# Patient Record
Sex: MALE | Race: WHITE | NOT HISPANIC OR LATINO | Employment: FULL TIME | URBAN - METROPOLITAN AREA
[De-identification: names, ages, dates, MRNs, and addresses within clinical notes are randomized per-mention and may not be internally consistent; named-entity substitution may affect disease eponyms.]

---
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
## 2024-01-09 ENCOUNTER — TELEPHONE (OUTPATIENT)
Dept: PEDIATRIC GASTROENTEROLOGY | Facility: HOSPITAL | Age: 19
End: 2024-01-09
Payer: COMMERCIAL

## 2024-01-09 DIAGNOSIS — K50.90 CROHN'S DISEASE WITHOUT COMPLICATION, UNSPECIFIED GASTROINTESTINAL TRACT LOCATION (MULTI): ICD-10-CM

## 2024-01-09 DIAGNOSIS — Z79.620 ADALIMUMAB (HUMIRA) LONG-TERM USE: ICD-10-CM

## 2024-01-09 RX ORDER — ADALIMUMAB 40MG/0.4ML
40 KIT SUBCUTANEOUS
Qty: 4 EACH | Refills: 11 | Status: SHIPPED | OUTPATIENT
Start: 2024-01-09

## 2024-01-09 NOTE — TELEPHONE ENCOUNTER
Did  you get a request for clinicals from [facility] for the Humira?  They told dad to check with us about that.

## 2024-01-12 ENCOUNTER — TELEPHONE (OUTPATIENT)
Dept: PEDIATRIC GASTROENTEROLOGY | Facility: HOSPITAL | Age: 19
End: 2024-01-12
Payer: COMMERCIAL

## 2024-01-17 ENCOUNTER — TELEPHONE (OUTPATIENT)
Dept: PEDIATRIC GASTROENTEROLOGY | Facility: CLINIC | Age: 19
End: 2024-01-17
Payer: COMMERCIAL

## 2024-05-14 ENCOUNTER — LAB (OUTPATIENT)
Dept: LAB | Facility: LAB | Age: 19
End: 2024-05-14
Payer: COMMERCIAL

## 2024-05-14 DIAGNOSIS — K50.00 CROHN'S DISEASE OF SMALL INTESTINE WITHOUT COMPLICATION (MULTI): Chronic | ICD-10-CM

## 2024-05-14 PROCEDURE — 83993 ASSAY FOR CALPROTECTIN FECAL: CPT

## 2024-05-15 ENCOUNTER — OFFICE VISIT (OUTPATIENT)
Dept: PEDIATRIC GASTROENTEROLOGY | Facility: CLINIC | Age: 19
End: 2024-05-15
Payer: COMMERCIAL

## 2024-05-15 VITALS — TEMPERATURE: 97.5 F | BODY MASS INDEX: 25.86 KG/M2 | WEIGHT: 190.92 LBS | HEIGHT: 72 IN

## 2024-05-15 DIAGNOSIS — K50.00 CROHN'S DISEASE OF SMALL INTESTINE WITHOUT COMPLICATION (MULTI): Primary | ICD-10-CM

## 2024-05-15 DIAGNOSIS — Z79.620 ADALIMUMAB (HUMIRA) LONG-TERM USE: ICD-10-CM

## 2024-05-15 PROCEDURE — 99214 OFFICE O/P EST MOD 30 MIN: CPT | Performed by: NURSE PRACTITIONER

## 2024-05-15 PROCEDURE — 1036F TOBACCO NON-USER: CPT | Performed by: NURSE PRACTITIONER

## 2024-05-15 ASSESSMENT — ENCOUNTER SYMPTOMS
EYE REDNESS: 0
HEMATOLOGIC/LYMPHATIC NEGATIVE: 1
FATIGUE: 0
ACTIVITY CHANGE: 0
ENDOCRINE NEGATIVE: 1
JOINT SWELLING: 0
CHOKING: 0
TROUBLE SWALLOWING: 0
EYE PAIN: 0
COUGH: 0
PSYCHIATRIC NEGATIVE: 1
SORE THROAT: 0
ROS GI COMMENTS: AS NOTED IN HPI
HEADACHES: 0
DYSURIA: 0
CARDIOVASCULAR NEGATIVE: 1
SEIZURES: 0
APPETITE CHANGE: 0
ARTHRALGIAS: 0

## 2024-05-15 NOTE — LETTER
May 19, 2024     Jabier Harrison MD  [id number] [facility]  [address]  [facility] [address]    Patient: Neo Chavez   YOB: 2005   Date of Visit: 5/15/2024       Dear Dr. Jabier Harrison MD:    Thank you for referring Neo Chavez to me for evaluation. Below are my notes for this consultation.  If you have questions, please do not hesitate to call me. I look forward to following your patient along with you.       Sincerely,     Antonietta Wan, APRN-CNP      CC: No Recipients  ______________________________________________________________________________________    Pediatric Gastroenterology Follow Up Office Visit    Neo Chavez was seen in the [facility] Pediatric Gastroenterology, Hepatology & Nutrition Clinic in follow-up on 5/15/2024  for ileal Crohn's disease  Chief Complaint   Patient presents with   • Crohn's Disease       History of Present Illness:   Neo Chavez is a 19 y.o. male who presents to GI clinic for the management of Ileal Crohn's disease. He is doing well today. No symptoms between injections. Continues to get small patch of eczema above left ear. Heat helps improve patch. Uses eczema cream as needed which helps keep moist. Humira injections on Wednesdays.  Weight is down today but has changed eating habits and is working as a  this summer.     Medications:  - Humira 40mg weekly    Review of Systems   Constitutional:  Negative for activity change, appetite change and fatigue.   HENT:  Negative for mouth sores, sore throat and trouble swallowing.    Eyes:  Negative for pain and redness.   Respiratory:  Negative for cough and choking.    Cardiovascular: Negative.    Gastrointestinal:         As noted in HPI   Endocrine: Negative.    Genitourinary:  Negative for dysuria and enuresis.   Musculoskeletal:  Negative for arthralgias and joint swelling.   Skin: Negative.    Neurological:  Negative for seizures and headaches.   Hematological:  Negative.    Psychiatric/Behavioral: Negative.          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   • Vitamin D deficiency 06/15/2023   • Weight loss, abnormal 06/15/2023     Past Medical History:   Diagnosis Date   • Personal history of other specified conditions 10/28/2020   • Unspecified injury of unspecified ankle, initial encounter 09/09/2015   • Unspecified open wound of abdominal wall, unspecified quadrant without penetration into peritoneal cavity, initial encounter 08/10/2020   • Unspecified otitis externa, unspecified ear 07/16/2016       Past Medical History:   Diagnosis Date   • Abdominal pain 06/15/2023   • Encopresis 06/15/2023   • Excoriated rash 06/15/2023   • Gastritis 06/15/2023   • Hematochezia 06/15/2023   • Hidradenitis suppurativa 06/15/2023   • 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   • Personal history of other specified conditions 10/28/2020    History of abdominal pain   • Unspecified injury of unspecified ankle, initial encounter 09/09/2015    Injury, ankle   • Unspecified open wound of abdominal wall, unspecified quadrant without penetration into peritoneal cavity, initial encounter 08/10/2020    Nonhealing open wound of groin   • Unspecified otitis externa, unspecified ear 07/16/2016    Otitis externa   • Weight loss, abnormal 06/15/2023       Past Surgical History:   Procedure Laterality Date   • OTHER SURGICAL HISTORY  08/14/2020    No history of surgery       No family history on  "file.    Social History     Social History Narrative   • Not on file       No Known Allergies      Current Outpatient Medications on File Prior to Visit   Medication Sig Dispense Refill   • Humira,CF, Pen 40 mg/0.4 mL pen injector kit pen-injector Inject 1 Pen (40 mg) under the skin 1 (one) time per week. 4 each 11     No current facility-administered medications on file prior to visit.       PHYSICAL EXAMINATION:  Vital signs : Temp 36.4 °C (97.5 °F)   Ht 1.822 m (5' 11.73\")   Wt 86.6 kg (190 lb 14.7 oz)   BMI 26.09 kg/m²  Facility age limit for growth %kathe is 19 years.    Physical Exam  Constitutional:       Appearance: Normal appearance.   HENT:      Head: Normocephalic.      Right Ear: External ear normal.      Left Ear: External ear normal.      Nose: Nose normal.      Mouth/Throat:      Mouth: Mucous membranes are moist.   Eyes:      Conjunctiva/sclera: Conjunctivae normal.   Cardiovascular:      Rate and Rhythm: Normal rate and regular rhythm.      Heart sounds: Normal heart sounds.   Pulmonary:      Effort: Pulmonary effort is normal.      Breath sounds: Normal breath sounds.   Abdominal:      General: Bowel sounds are normal.      Palpations: Abdomen is soft.   Musculoskeletal:         General: Normal range of motion.   Skin:     General: Skin is warm and dry.   Neurological:      Mental Status: He is alert and oriented to person, place, and time.   Psychiatric:         Mood and Affect: Mood normal.          IMPRESSION & RECOMMENDATIONS/PLAN: Neo Chavez is a 19 y.o. old who presents for consultation to the Pediatric Gastroenterology clinic today for evaluation and management of Crohn's disease of the small intestine, maintained on Humira monotherapy. He is doing well, asymptomatic. Will obtain blood work, including Humira level. Recommended Eucerin cream to his ear and if no improvement, will refer to Dermatology.     This patient is receiving a medication that has significant potential toxicity " and requires close and intensive monitoring.  .    Patient Instructions   1. Blood work next week  2. Continue Humira  3. Follow up over [place] break      XOCHILT Soriano  Division of Pediatric Gastroenterology, Hepatology and Nutrition    ICN NOTEFORM  Neo is a 19 y.o. male with Crohn's disease.  His Crohn's phenotype is inflammatory, non-penetrating, non-stricturing.    Extent of disease involvement   Macroscopic lower tract involvement: ileal only  Macroscopic upper GI tract disease proximal to Ligament of Treitz: yes   Macroscopic upper GI tract disease distal to Ligament of Treitz: yes   Perianal disease: yes    Current symptoms (on the worst day in past 7 days)  He reports on the worst day his general well-being is normal.     Limitations in daily activities were described as: no limitations.    Abdominal pain: none.    Stool number on the worst day in past 7 days:   .  The number of liquid/watery stools per day was 1 .  Most of the stools were described as formed.     Nocturnal diarrhea: no .  He reported no bloody stools .   .    Extraintestinal manifestations:   Fever greater than 38.5C for 3 of last 7 days: no  Definite arthritis: no  Uveitis: no  Erythema nodosum:  no  Pyoderma gangrenosum: no     Current meds/therapies:  Enteral supplement: is not on an enteral supplement.   .    ICN Assessment:  Based on current information, my global assessment of current disease status is his disease is quiescent.   Neo's growth status is satisfactory.  The overall nutritional status is satisfactory.      His primary gastroenterologist will be XOCHILT Soriano.

## 2024-05-15 NOTE — PROGRESS NOTES
Pediatric Gastroenterology Follow Up Office Visit    Neo Chavez was seen in the [facility] Pediatric Gastroenterology, Hepatology & Nutrition Clinic in follow-up on 5/15/2024  for ileal Crohn's disease  Chief Complaint   Patient presents with    Crohn's Disease       History of Present Illness:   Neo Chavez is a 19 y.o. male who presents to GI clinic for the management of Ileal Crohn's disease. He is doing well today. No symptoms between injections. Continues to get small patch of eczema above left ear. Heat helps improve patch. Uses eczema cream as needed which helps keep moist. Humira injections on Wednesdays.  Weight is down today but has changed eating habits and is working as a  this summer.     Medications:  - Humira 40mg weekly    Review of Systems   Constitutional:  Negative for activity change, appetite change and fatigue.   HENT:  Negative for mouth sores, sore throat and trouble swallowing.    Eyes:  Negative for pain and redness.   Respiratory:  Negative for cough and choking.    Cardiovascular: Negative.    Gastrointestinal:         As noted in HPI   Endocrine: Negative.    Genitourinary:  Negative for dysuria and enuresis.   Musculoskeletal:  Negative for arthralgias and joint swelling.   Skin: Negative.    Neurological:  Negative for seizures and headaches.   Hematological: Negative.    Psychiatric/Behavioral: Negative.          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    Vitamin D  "deficiency 06/15/2023    Weight loss, abnormal 06/15/2023     Past Medical History:   Diagnosis Date    Personal history of other specified conditions 10/28/2020    Unspecified injury of unspecified ankle, initial encounter 09/09/2015    Unspecified open wound of abdominal wall, unspecified quadrant without penetration into peritoneal cavity, initial encounter 08/10/2020    Unspecified otitis externa, unspecified ear 07/16/2016       Past Medical History:   Diagnosis Date    Abdominal pain 06/15/2023    Encopresis 06/15/2023    Excoriated rash 06/15/2023    Gastritis 06/15/2023    Hematochezia 06/15/2023    Hidradenitis suppurativa 06/15/2023    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    Personal history of other specified conditions 10/28/2020    History of abdominal pain    Unspecified injury of unspecified ankle, initial encounter 09/09/2015    Injury, ankle    Unspecified open wound of abdominal wall, unspecified quadrant without penetration into peritoneal cavity, initial encounter 08/10/2020    Nonhealing open wound of groin    Unspecified otitis externa, unspecified ear 07/16/2016    Otitis externa    Weight loss, abnormal 06/15/2023       Past Surgical History:   Procedure Laterality Date    OTHER SURGICAL HISTORY  08/14/2020    No history of surgery       No family history on file.    Social History     Social History Narrative    Not on file       No Known Allergies      Current Outpatient Medications on File Prior to Visit   Medication Sig Dispense Refill    Humira,CF, Pen 40 mg/0.4 mL pen injector kit pen-injector Inject 1 Pen (40 mg) under the skin 1 (one) time per week. 4 each 11     No current facility-administered medications on file prior to visit.       PHYSICAL EXAMINATION:  Vital signs : Temp 36.4 °C (97.5 °F)   Ht 1.822 m (5' 11.73\")   Wt 86.6 kg (190 lb 14.7 oz)   BMI 26.09 kg/m²  Facility age limit for growth %kathe is 19 years.    Physical Exam  Constitutional:       " Appearance: Normal appearance.   HENT:      Head: Normocephalic.      Right Ear: External ear normal.      Left Ear: External ear normal.      Nose: Nose normal.      Mouth/Throat:      Mouth: Mucous membranes are moist.   Eyes:      Conjunctiva/sclera: Conjunctivae normal.   Cardiovascular:      Rate and Rhythm: Normal rate and regular rhythm.      Heart sounds: Normal heart sounds.   Pulmonary:      Effort: Pulmonary effort is normal.      Breath sounds: Normal breath sounds.   Abdominal:      General: Bowel sounds are normal.      Palpations: Abdomen is soft.   Musculoskeletal:         General: Normal range of motion.   Skin:     General: Skin is warm and dry.   Neurological:      Mental Status: He is alert and oriented to person, place, and time.   Psychiatric:         Mood and Affect: Mood normal.          IMPRESSION & RECOMMENDATIONS/PLAN: Neo Chavez is a 19 y.o. old who presents for consultation to the Pediatric Gastroenterology clinic today for evaluation and management of Crohn's disease of the small intestine, maintained on Humira monotherapy. He is doing well, asymptomatic. Will obtain blood work, including Humira level. Recommended Eucerin cream to his ear and if no improvement, will refer to Dermatology.     This patient is receiving a medication that has significant potential toxicity and requires close and intensive monitoring.  .    Patient Instructions   1. Blood work next week  2. Continue Humira  3. Follow up over [place] BRIAN Urban-CNP  Division of Pediatric Gastroenterology, Hepatology and Nutrition    ICN NOTEFORM  Neo is a 19 y.o. male with Crohn's disease.  His Crohn's phenotype is inflammatory, non-penetrating, non-stricturing.    Extent of disease involvement   Macroscopic lower tract involvement: ileal only  Macroscopic upper GI tract disease proximal to Ligament of Treitz: yes   Macroscopic upper GI tract disease distal to Ligament of Treitz: yes    Perianal disease: yes    Current symptoms (on the worst day in past 7 days)  He reports on the worst day his general well-being is normal.     Limitations in daily activities were described as: no limitations.    Abdominal pain: none.    Stool number on the worst day in past 7 days:   .  The number of liquid/watery stools per day was 1 .  Most of the stools were described as formed.     Nocturnal diarrhea: no .  He reported no bloody stools .   .    Extraintestinal manifestations:   Fever greater than 38.5C for 3 of last 7 days: no  Definite arthritis: no  Uveitis: no  Erythema nodosum:  no  Pyoderma gangrenosum: no     Current meds/therapies:  Enteral supplement: is not on an enteral supplement.   .    ICN Assessment:  Based on current information, my global assessment of current disease status is his disease is quiescent.   Neo's growth status is satisfactory.  The overall nutritional status is satisfactory.      His primary gastroenterologist will be Antonietta Wan APRN-CNP.

## 2024-05-16 LAB — CALPROTECTIN STL-MCNT: 21 UG/G

## 2024-05-19 ASSESSMENT — ENCOUNTER SYMPTOMS
NUMBER OF DAILY LIQUID STOOLS PAST SEVEN DAYS: 1
BLOOD IN STOOL: 0
STOOL DESCRIPTION: FORMED
FEVER >38.5 C ON THREE OF THE PAST SEVEN DAYS: 0

## 2024-05-22 ENCOUNTER — LAB (OUTPATIENT)
Dept: LAB | Facility: LAB | Age: 19
End: 2024-05-22
Payer: COMMERCIAL

## 2024-05-22 DIAGNOSIS — K50.00 CROHN'S DISEASE OF SMALL INTESTINE WITHOUT COMPLICATION (MULTI): ICD-10-CM

## 2024-05-22 LAB
25(OH)D3 SERPL-MCNC: 25 NG/ML (ref 30–100)
ALBUMIN SERPL BCP-MCNC: 4.9 G/DL (ref 3.4–5)
ALP SERPL-CCNC: 78 U/L (ref 33–120)
ALT SERPL W P-5'-P-CCNC: 16 U/L (ref 10–52)
ANION GAP SERPL CALC-SCNC: 15 MMOL/L (ref 10–20)
AST SERPL W P-5'-P-CCNC: 14 U/L (ref 9–39)
BILIRUB SERPL-MCNC: 1.1 MG/DL (ref 0–1.2)
BUN SERPL-MCNC: 13 MG/DL (ref 6–23)
CALCIUM SERPL-MCNC: 9.9 MG/DL (ref 8.6–10.6)
CHLORIDE SERPL-SCNC: 103 MMOL/L (ref 98–107)
CO2 SERPL-SCNC: 28 MMOL/L (ref 21–32)
CREAT SERPL-MCNC: 0.93 MG/DL (ref 0.5–1.3)
CRP SERPL-MCNC: <0.1 MG/DL
EGFRCR SERPLBLD CKD-EPI 2021: >90 ML/MIN/1.73M*2
ERYTHROCYTE [DISTWIDTH] IN BLOOD BY AUTOMATED COUNT: 18.6 % (ref 11.5–14.5)
ERYTHROCYTE [SEDIMENTATION RATE] IN BLOOD BY WESTERGREN METHOD: 6 MM/H (ref 0–15)
GGT SERPL-CCNC: 12 U/L (ref 5–64)
GLUCOSE SERPL-MCNC: 89 MG/DL (ref 74–99)
HCT VFR BLD AUTO: 44.5 % (ref 41–52)
HGB BLD-MCNC: 13.6 G/DL (ref 13.5–17.5)
MCH RBC QN AUTO: 19.4 PG (ref 26–34)
MCHC RBC AUTO-ENTMCNC: 30.6 G/DL (ref 32–36)
MCV RBC AUTO: 64 FL (ref 80–100)
NRBC BLD-RTO: 0 /100 WBCS (ref 0–0)
PLATELET # BLD AUTO: 300 X10*3/UL (ref 150–450)
POTASSIUM SERPL-SCNC: 4.7 MMOL/L (ref 3.5–5.3)
PROT SERPL-MCNC: 7.4 G/DL (ref 6.4–8.2)
RBC # BLD AUTO: 7 X10*6/UL (ref 4.5–5.9)
SODIUM SERPL-SCNC: 141 MMOL/L (ref 136–145)
WBC # BLD AUTO: 4.9 X10*3/UL (ref 4.4–11.3)

## 2024-05-22 PROCEDURE — 82306 VITAMIN D 25 HYDROXY: CPT

## 2024-05-22 PROCEDURE — 82977 ASSAY OF GGT: CPT

## 2024-05-22 PROCEDURE — 86140 C-REACTIVE PROTEIN: CPT

## 2024-05-22 PROCEDURE — 36415 COLL VENOUS BLD VENIPUNCTURE: CPT

## 2024-05-22 PROCEDURE — 85027 COMPLETE CBC AUTOMATED: CPT

## 2024-05-22 PROCEDURE — 80053 COMPREHEN METABOLIC PANEL: CPT

## 2024-05-22 PROCEDURE — 85652 RBC SED RATE AUTOMATED: CPT

## 2024-10-17 ENCOUNTER — TELEPHONE (OUTPATIENT)
Dept: PEDIATRIC GASTROENTEROLOGY | Facility: CLINIC | Age: 19
End: 2024-10-17
Payer: COMMERCIAL

## 2024-10-17 DIAGNOSIS — Z79.620 ADALIMUMAB (HUMIRA) LONG-TERM USE: ICD-10-CM

## 2024-10-17 DIAGNOSIS — A49.8 CLOSTRIDIUM DIFFICILE INFECTION: Primary | ICD-10-CM

## 2024-10-17 DIAGNOSIS — K50.00 CROHN'S DISEASE OF SMALL INTESTINE WITHOUT COMPLICATION (MULTI): ICD-10-CM

## 2024-10-17 DIAGNOSIS — K52.9 IBD (INFLAMMATORY BOWEL DISEASE): ICD-10-CM

## 2024-10-17 RX ORDER — HYOSCYAMINE SULFATE 0.12 MG/1
0.12 TABLET, ORALLY DISINTEGRATING ORAL EVERY 4 HOURS PRN
Qty: 30 EACH | Refills: 1 | Status: SHIPPED | OUTPATIENT
Start: 2024-10-17

## 2024-10-18 ENCOUNTER — LAB (OUTPATIENT)
Dept: LAB | Facility: LAB | Age: 19
End: 2024-10-18
Payer: COMMERCIAL

## 2024-10-18 DIAGNOSIS — K50.00 CROHN'S DISEASE OF SMALL INTESTINE WITHOUT COMPLICATION (MULTI): ICD-10-CM

## 2024-10-18 DIAGNOSIS — Z79.620 ADALIMUMAB (HUMIRA) LONG-TERM USE: ICD-10-CM

## 2024-10-18 LAB
ERYTHROCYTE [DISTWIDTH] IN BLOOD BY AUTOMATED COUNT: 18.6 % (ref 11.5–14.5)
ERYTHROCYTE [SEDIMENTATION RATE] IN BLOOD BY WESTERGREN METHOD: 7 MM/H (ref 0–15)
HCT VFR BLD AUTO: 44.1 % (ref 41–52)
HGB BLD-MCNC: 13 G/DL (ref 13.5–17.5)
MCH RBC QN AUTO: 18.3 PG (ref 26–34)
MCHC RBC AUTO-ENTMCNC: 29.5 G/DL (ref 32–36)
MCV RBC AUTO: 62 FL (ref 80–100)
NRBC BLD-RTO: 0 /100 WBCS (ref 0–0)
PLATELET # BLD AUTO: 327 X10*3/UL (ref 150–450)
RBC # BLD AUTO: 7.1 X10*6/UL (ref 4.5–5.9)
WBC # BLD AUTO: 6.8 X10*3/UL (ref 4.4–11.3)

## 2024-10-18 PROCEDURE — 36415 COLL VENOUS BLD VENIPUNCTURE: CPT

## 2024-10-18 PROCEDURE — 85027 COMPLETE CBC AUTOMATED: CPT

## 2024-10-18 PROCEDURE — 86140 C-REACTIVE PROTEIN: CPT

## 2024-10-18 PROCEDURE — 85652 RBC SED RATE AUTOMATED: CPT

## 2024-10-18 PROCEDURE — 80053 COMPREHEN METABOLIC PANEL: CPT

## 2024-10-18 PROCEDURE — 86481 TB AG RESPONSE T-CELL SUSP: CPT

## 2024-10-19 LAB
ALBUMIN SERPL BCP-MCNC: 4.4 G/DL (ref 3.4–5)
ALP SERPL-CCNC: 71 U/L (ref 33–120)
ALT SERPL W P-5'-P-CCNC: 17 U/L (ref 10–52)
ANION GAP SERPL CALC-SCNC: 12 MMOL/L (ref 10–20)
AST SERPL W P-5'-P-CCNC: 13 U/L (ref 9–39)
BILIRUB SERPL-MCNC: 1 MG/DL (ref 0–1.2)
BUN SERPL-MCNC: 11 MG/DL (ref 6–23)
CALCIUM SERPL-MCNC: 9.3 MG/DL (ref 8.6–10.6)
CHLORIDE SERPL-SCNC: 101 MMOL/L (ref 98–107)
CO2 SERPL-SCNC: 29 MMOL/L (ref 21–32)
CREAT SERPL-MCNC: 0.95 MG/DL (ref 0.5–1.3)
CRP SERPL-MCNC: 0.2 MG/DL
EGFRCR SERPLBLD CKD-EPI 2021: >90 ML/MIN/1.73M*2
GLUCOSE SERPL-MCNC: 95 MG/DL (ref 74–99)
POTASSIUM SERPL-SCNC: 4.4 MMOL/L (ref 3.5–5.3)
PROT SERPL-MCNC: 7.1 G/DL (ref 6.4–8.2)
SODIUM SERPL-SCNC: 138 MMOL/L (ref 136–145)

## 2024-10-19 PROCEDURE — 83993 ASSAY FOR CALPROTECTIN FECAL: CPT

## 2024-10-19 PROCEDURE — 87506 IADNA-DNA/RNA PROBE TQ 6-11: CPT

## 2024-10-20 LAB
NIL(NEG) CONTROL SPOT COUNT: NORMAL
PANEL A SPOT COUNT: 0
PANEL B SPOT COUNT: 0
POS CONTROL SPOT COUNT: NORMAL
T-SPOT. TB INTERPRETATION: NEGATIVE

## 2024-10-21 ENCOUNTER — LAB (OUTPATIENT)
Dept: LAB | Facility: LAB | Age: 19
End: 2024-10-21
Payer: COMMERCIAL

## 2024-10-21 ENCOUNTER — LAB REQUISITION (OUTPATIENT)
Dept: LAB | Facility: HOSPITAL | Age: 19
End: 2024-10-21
Payer: COMMERCIAL

## 2024-10-21 DIAGNOSIS — K50.00 CROHN'S DISEASE OF SMALL INTESTINE WITHOUT COMPLICATIONS (MULTI): ICD-10-CM

## 2024-10-21 DIAGNOSIS — K50.00 CROHN'S DISEASE OF SMALL INTESTINE WITHOUT COMPLICATION (MULTI): ICD-10-CM

## 2024-10-21 PROCEDURE — 87493 C DIFF AMPLIFIED PROBE: CPT

## 2024-10-21 PROCEDURE — 87328 CRYPTOSPORIDIUM AG IA: CPT

## 2024-10-21 PROCEDURE — 87506 IADNA-DNA/RNA PROBE TQ 6-11: CPT

## 2024-10-21 PROCEDURE — 87329 GIARDIA AG IA: CPT

## 2024-10-22 LAB
C COLI+JEJ+UPSA DNA STL QL NAA+PROBE: NOT DETECTED
C DIF TOX TCDA+TCDB STL QL NAA+PROBE: DETECTED
EC STX1 GENE STL QL NAA+PROBE: NOT DETECTED
EC STX2 GENE STL QL NAA+PROBE: NOT DETECTED
NOROVIRUS GI + GII RNA STL NAA+PROBE: NOT DETECTED
RV RNA STL NAA+PROBE: NOT DETECTED
SALMONELLA DNA STL QL NAA+PROBE: NOT DETECTED
SHIGELLA DNA SPEC QL NAA+PROBE: NOT DETECTED
V CHOLERAE DNA STL QL NAA+PROBE: NOT DETECTED
Y ENTEROCOL DNA STL QL NAA+PROBE: NOT DETECTED

## 2024-10-22 RX ORDER — METRONIDAZOLE 500 MG/1
500 TABLET ORAL 3 TIMES DAILY
Qty: 30 TABLET | Refills: 0 | Status: SHIPPED | OUTPATIENT
Start: 2024-10-22 | End: 2024-11-01

## 2024-10-23 LAB

## 2024-10-24 LAB
CALPROTECTIN STL-MCNT: 52 UG/G
CRYPTOSP AG STL QL IA: NEGATIVE
G LAMBLIA AG STL QL IA: NEGATIVE

## 2024-10-28 LAB — O+P STL MICRO: NEGATIVE

## 2024-11-06 DIAGNOSIS — K50.90 CROHN'S DISEASE WITHOUT COMPLICATION, UNSPECIFIED GASTROINTESTINAL TRACT LOCATION: ICD-10-CM

## 2024-11-06 DIAGNOSIS — Z79.620 ADALIMUMAB (HUMIRA) LONG-TERM USE: ICD-10-CM

## 2024-11-06 RX ORDER — ADALIMUMAB 40MG/0.4ML
40 KIT SUBCUTANEOUS
Qty: 4 EACH | Refills: 11 | Status: SHIPPED | OUTPATIENT
Start: 2024-11-06

## 2024-12-11 ENCOUNTER — APPOINTMENT (OUTPATIENT)
Dept: PEDIATRIC GASTROENTEROLOGY | Facility: CLINIC | Age: 19
End: 2024-12-11
Payer: COMMERCIAL

## 2024-12-11 VITALS — HEIGHT: 71 IN | TEMPERATURE: 96 F | WEIGHT: 172.84 LBS | BODY MASS INDEX: 24.2 KG/M2

## 2024-12-11 DIAGNOSIS — K50.00 CROHN'S DISEASE OF SMALL INTESTINE WITHOUT COMPLICATION (MULTI): Primary | ICD-10-CM

## 2024-12-11 DIAGNOSIS — Z79.620 ADALIMUMAB (HUMIRA) LONG-TERM USE: ICD-10-CM

## 2024-12-11 PROCEDURE — 1036F TOBACCO NON-USER: CPT | Performed by: NURSE PRACTITIONER

## 2024-12-11 PROCEDURE — 3008F BODY MASS INDEX DOCD: CPT | Performed by: NURSE PRACTITIONER

## 2024-12-11 PROCEDURE — 99214 OFFICE O/P EST MOD 30 MIN: CPT | Performed by: NURSE PRACTITIONER

## 2024-12-11 ASSESSMENT — ENCOUNTER SYMPTOMS
DYSURIA: 0
EYE PAIN: 0
HEMATOLOGIC/LYMPHATIC NEGATIVE: 1
CHOKING: 0
ENDOCRINE NEGATIVE: 1
SORE THROAT: 0
APPETITE CHANGE: 0
NUMBER OF DAILY LIQUID STOOLS PAST SEVEN DAYS: 0
BLOOD IN STOOL: 0
SEIZURES: 0
ACTIVITY CHANGE: 0
STOOL DESCRIPTION: FORMED
PSYCHIATRIC NEGATIVE: 1
ARTHRALGIAS: 0
FEVER >38.5 C ON THREE OF THE PAST SEVEN DAYS: 0
TROUBLE SWALLOWING: 0
EYE REDNESS: 0
UNEXPECTED WEIGHT CHANGE: 1
JOINT SWELLING: 0
COUGH: 0
HEADACHES: 0
FATIGUE: 0
NUMBER OF DAILY STOOLS PAST SEVEN DAYS: 2
ROS GI COMMENTS: AS NOTED IN HPI
CARDIOVASCULAR NEGATIVE: 1

## 2024-12-11 NOTE — PROGRESS NOTES
"Pediatric Gastroenterology Follow Up Office Visit    Neo Chavez \"Angel" was seen in the [facility] Pediatric Gastroenterology, Hepatology & Nutrition Clinic in follow-up on 12/11/24  for ileal Crohn's disease  Chief Complaint   Patient presents with    Crohn's Disease       History of Present Illness:   Neo Chavez \"Angel" is a 19 y.o. male who presents to GI clinic for the management of Ileal Crohn's disease. He is doing well today. Had C.diff in October but has otherwise been fine. No symptoms between injections. Continues to get small patch of eczema above left ear. Heat helps improve patch. Uses eczema cream as needed which helps keep moist. Humira injections on Wednesdays, due today.  Weight is down again today, but continues to eat 2 meals a day    Medications:  - Humira 40mg weekly    Neo Chavez is the historian of today's visit.     Review of Systems   Constitutional:  Positive for unexpected weight change (wt loss, but noted in HPI). Negative for activity change, appetite change and fatigue.   HENT:  Negative for mouth sores, sore throat and trouble swallowing.    Eyes:  Negative for pain and redness.   Respiratory:  Negative for cough and choking.    Cardiovascular: Negative.    Gastrointestinal:         As noted in HPI   Endocrine: Negative.    Genitourinary:  Negative for dysuria and enuresis.   Musculoskeletal:  Negative for arthralgias and joint swelling.   Skin: Negative.    Neurological:  Negative for seizures and headaches.   Hematological: Negative.    Psychiatric/Behavioral: Negative.          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 06/15/2023    Vitamin D deficiency 06/15/2023    Weight loss, abnormal 06/15/2023     Past Medical History:   Diagnosis Date    Personal history of other specified conditions 10/28/2020    Unspecified injury of unspecified ankle, initial encounter 09/09/2015    Unspecified open wound of abdominal wall, unspecified quadrant without penetration into peritoneal cavity, initial encounter 08/10/2020    Unspecified otitis externa, unspecified ear 07/16/2016       Past Medical History:   Diagnosis Date    Abdominal pain 06/15/2023    Encopresis 06/15/2023    Excoriated rash 06/15/2023    Gastritis 06/15/2023    Hematochezia 06/15/2023    Hidradenitis suppurativa 06/15/2023    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    Personal history of other specified conditions 10/28/2020    History of abdominal pain    Unspecified injury of unspecified ankle, initial encounter 09/09/2015    Injury, ankle    Unspecified open wound of abdominal wall, unspecified quadrant without penetration into peritoneal cavity, initial encounter 08/10/2020    Nonhealing open wound of groin    Unspecified otitis externa, unspecified ear 07/16/2016    Otitis externa    Weight loss, abnormal 06/15/2023       Past Surgical History:   Procedure Laterality Date    OTHER SURGICAL HISTORY  08/14/2020    No history of surgery       No family history on file.    Social History     Social History Narrative    Not on file       No Known Allergies      Current Outpatient Medications on File Prior to Visit   Medication Sig Dispense Refill    Humira,CF, Pen 40 mg/0.4 mL pen injector kit pen-injector Inject 1 Pen (40 mg) under the skin every 7 days. 4 each 11    hyoscyamine 0.125 mg disintegrating tablet Take 1 tablet (0.125 mg) by mouth every 4 hours if needed (abdominal pain/cramping). 30 each 1     No  "current facility-administered medications on file prior to visit.       PHYSICAL EXAMINATION:  Vital signs : Temp 35.6 °C (96 °F)   Ht 1.815 m (5' 11.46\")   Wt 78.4 kg (172 lb 13.5 oz)   BMI 23.80 kg/m²  Facility age limit for growth %kathe is 19 years.    Physical Exam  Constitutional:       Appearance: Normal appearance.   HENT:      Head: Normocephalic.      Right Ear: External ear normal.      Left Ear: External ear normal.      Nose: Nose normal.      Mouth/Throat:      Mouth: Mucous membranes are moist.   Eyes:      Conjunctiva/sclera: Conjunctivae normal.   Cardiovascular:      Rate and Rhythm: Normal rate and regular rhythm.      Heart sounds: Normal heart sounds.   Pulmonary:      Effort: Pulmonary effort is normal.      Breath sounds: Normal breath sounds.   Abdominal:      General: Bowel sounds are normal.      Palpations: Abdomen is soft.   Musculoskeletal:         General: Normal range of motion.   Skin:     General: Skin is warm and dry.   Neurological:      General: No focal deficit present.      Mental Status: He is alert.   Psychiatric:         Mood and Affect: Mood normal.          IMPRESSION & RECOMMENDATIONS/PLAN: Neo Chavez is a 19 y.o. old who presents for consultation to the Pediatric Gastroenterology clinic today for evaluation and management of Crohn's disease of the small intestine, maintained on Humira monotherapy. He is doing well, asymptomatic. Did have C.diff in the fall after abx. Will continue current therapy. Did discuss transition to adult care but will wait until he is finished with college      This patient is receiving a medication that has significant potential toxicity and requires close and intensive monitoring.      Patient Instructions   1. Continue Humira  2. Follow up in the summer     BRIAN Soriano-CNP  Division of Pediatric Gastroenterology, Hepatology and Nutrition    ICN NOTEFORM  Neo is a 19 y.o. male with Crohn's disease.  His Crohn's phenotype is " inflammatory, non-penetrating, non-stricturing.    Extent of disease involvement   Macroscopic lower tract involvement: ileal only  Macroscopic upper GI tract disease proximal to Ligament of Treitz: yes   Macroscopic upper GI tract disease distal to Ligament of Treitz: yes   Perianal disease: yes    Current symptoms (on the worst day in past 7 days)  He reports on the worst day his general well-being is normal.     Limitations in daily activities were described as: no limitations.    Abdominal pain: none.    Stool number on the worst day in past 7 days: 2 .  The number of liquid/watery stools per day was 0 .  Most of the stools were described as formed.     Nocturnal diarrhea: no .  He reported no bloody stools .   .    Extraintestinal manifestations:   Fever greater than 38.5C for 3 of last 7 days: no  Definite arthritis: no  Uveitis: no  Erythema nodosum:  no  Pyoderma gangrenosum: no     Current meds/therapies:  Enteral supplement: is not on an enteral supplement.   .    ICN Assessment:  Based on current information, my global assessment of current disease status is his disease is quiescent.   Neo's growth status is satisfactory.  The overall nutritional status is satisfactory.      His primary gastroenterologist will be Antonietta Wan APRN-CNP.

## 2024-12-11 NOTE — LETTER
"December 11, 2024     Jabier Harrison MD  [address]  [address]  [facility] [address]    Patient: Ab Chavez   YOB: 2005   Date of Visit: 12/11/2024       Dear Dr. Jabier Harrison MD:    Thank you for referring Ab Chavez to me for evaluation. Below are my notes for this consultation.  If you have questions, please do not hesitate to call me. I look forward to following your patient along with you.       Sincerely,     Antonietta Wan, APRN-CNP      CC: No Recipients  ______________________________________________________________________________________    Pediatric Gastroenterology Follow Up Office Visit    Neo Chavez \"Angel" was seen in the [facility] Pediatric Gastroenterology, Hepatology & Nutrition Clinic in follow-up on 12/11/24  for ileal Crohn's disease  Chief Complaint   Patient presents with   • Crohn's Disease       History of Present Illness:   Neo Chavez \"Angel" is a 19 y.o. male who presents to GI clinic for the management of Ileal Crohn's disease. He is doing well today. Had C.diff in October but has otherwise been fine. No symptoms between injections. Continues to get small patch of eczema above left ear. Heat helps improve patch. Uses eczema cream as needed which helps keep moist. Humira injections on Wednesdays, due today.  Weight is down again today, but continues to eat 2 meals a day    Medications:  - Humira 40mg weekly    Neo Chavez is the historian of today's visit.     Review of Systems   Constitutional:  Positive for unexpected weight change (wt loss, but noted in HPI). Negative for activity change, appetite change and fatigue.   HENT:  Negative for mouth sores, sore throat and trouble swallowing.    Eyes:  Negative for pain and redness.   Respiratory:  Negative for cough and choking.    Cardiovascular: Negative.    Gastrointestinal:         As noted in HPI   Endocrine: Negative.    Genitourinary:  Negative for dysuria " and enuresis.   Musculoskeletal:  Negative for arthralgias and joint swelling.   Skin: Negative.    Neurological:  Negative for seizures and headaches.   Hematological: Negative.    Psychiatric/Behavioral: Negative.          Active Ambulatory Problems     Diagnosis Date Noted   • Adalimumab (Humira) long-term use 06/15/2023   • Crohn's disease (Multi) 06/15/2023   • Anxiety reaction 06/15/2023     Resolved Ambulatory Problems     Diagnosis Date Noted   • Abdominal pain 06/15/2023   • Anal fissure 06/15/2023   • Encopresis 06/15/2023   • Excoriated rash 06/15/2023   • Fungal dermatitis 06/15/2023   • Gastritis 06/15/2023   • Hematochezia 06/15/2023   • Hidradenitis suppurativa 06/15/2023   • 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 06/15/2023   • Vitamin D deficiency 06/15/2023   • Weight loss, abnormal 06/15/2023     Past Medical History:   Diagnosis Date   • Personal history of other specified conditions 10/28/2020   • Unspecified injury of unspecified ankle, initial encounter 09/09/2015   • Unspecified open wound of abdominal wall, unspecified quadrant without penetration into peritoneal cavity, initial encounter 08/10/2020   • Unspecified otitis externa, unspecified ear 07/16/2016       Past Medical History:   Diagnosis Date   • Abdominal pain 06/15/2023   • Encopresis 06/15/2023   • Excoriated rash 06/15/2023   • Gastritis 06/15/2023   • Hematochezia 06/15/2023   • Hidradenitis suppurativa 06/15/2023   • 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   • Personal history of other specified conditions 10/28/2020    History of abdominal pain   • Unspecified injury of unspecified ankle, initial encounter 09/09/2015    Injury, ankle   • Unspecified open wound of abdominal wall, unspecified quadrant without penetration into peritoneal cavity, initial encounter 08/10/2020    Nonhealing open wound of groin   • Unspecified otitis externa, unspecified ear 07/16/2016    Otitis externa   • Weight loss, abnormal  "06/15/2023       Past Surgical History:   Procedure Laterality Date   • OTHER SURGICAL HISTORY  08/14/2020    No history of surgery       No family history on file.    Social History     Social History Narrative   • Not on file       No Known Allergies      Current Outpatient Medications on File Prior to Visit   Medication Sig Dispense Refill   • Humira,CF, Pen 40 mg/0.4 mL pen injector kit pen-injector Inject 1 Pen (40 mg) under the skin every 7 days. 4 each 11   • hyoscyamine 0.125 mg disintegrating tablet Take 1 tablet (0.125 mg) by mouth every 4 hours if needed (abdominal pain/cramping). 30 each 1     No current facility-administered medications on file prior to visit.       PHYSICAL EXAMINATION:  Vital signs : Temp 35.6 °C (96 °F)   Ht 1.815 m (5' 11.46\")   Wt 78.4 kg (172 lb 13.5 oz)   BMI 23.80 kg/m²  Facility age limit for growth %kathe is 20 years.    Physical Exam  Constitutional:       Appearance: Normal appearance.   HENT:      Head: Normocephalic.      Right Ear: External ear normal.      Left Ear: External ear normal.      Nose: Nose normal.      Mouth/Throat:      Mouth: Mucous membranes are moist.   Eyes:      Conjunctiva/sclera: Conjunctivae normal.   Cardiovascular:      Rate and Rhythm: Normal rate and regular rhythm.      Heart sounds: Normal heart sounds.   Pulmonary:      Effort: Pulmonary effort is normal.      Breath sounds: Normal breath sounds.   Abdominal:      General: Bowel sounds are normal.      Palpations: Abdomen is soft.   Musculoskeletal:         General: Normal range of motion.   Skin:     General: Skin is warm and dry.   Neurological:      General: No focal deficit present.      Mental Status: He is alert.   Psychiatric:         Mood and Affect: Mood normal.          IMPRESSION & RECOMMENDATIONS/PLAN: Neo Chavez is a 19 y.o. old who presents for consultation to the Pediatric Gastroenterology clinic today for evaluation and management of Crohn's disease of the small " intestine, maintained on Humira monotherapy. He is doing well, asymptomatic. Did have C.diff in the fall after abx. Will continue current therapy. Did discuss transition to adult care but will wait until he is finished with college      This patient is receiving a medication that has significant potential toxicity and requires close and intensive monitoring.      Patient Instructions   1. Continue Humira  2. Follow up in the summer     XOCHILT Soriano  Division of Pediatric Gastroenterology, Hepatology and Nutrition    ICN NOTEFORM  Neo is a 19 y.o. male with Crohn's disease.  His Crohn's phenotype is inflammatory, non-penetrating, non-stricturing.    Extent of disease involvement   Macroscopic lower tract involvement: ileal only  Macroscopic upper GI tract disease proximal to Ligament of Treitz: yes   Macroscopic upper GI tract disease distal to Ligament of Treitz: yes   Perianal disease: yes    Current symptoms (on the worst day in past 7 days)  He reports on the worst day his general well-being is normal.     Limitations in daily activities were described as: no limitations.    Abdominal pain: none.    Stool number on the worst day in past 7 days: 2 .  The number of liquid/watery stools per day was 0 .  Most of the stools were described as formed.     Nocturnal diarrhea: no .  He reported no bloody stools .   .    Extraintestinal manifestations:   Fever greater than 38.5C for 3 of last 7 days: no  Definite arthritis: no  Uveitis: no  Erythema nodosum:  no  Pyoderma gangrenosum: no     Current meds/therapies:  Enteral supplement: is not on an enteral supplement.   .    ICN Assessment:  Based on current information, my global assessment of current disease status is his disease is quiescent.   Neo's growth status is satisfactory.  The overall nutritional status is satisfactory.      His primary gastroenterologist will be XOCHILT Soriano.

## 2024-12-26 ENCOUNTER — TELEPHONE (OUTPATIENT)
Dept: PEDIATRIC GASTROENTEROLOGY | Facility: CLINIC | Age: 19
End: 2024-12-26
Payer: COMMERCIAL

## 2024-12-26 NOTE — TELEPHONE ENCOUNTER
Received fax from [employer] stating the humira needs a PA     Key: [id number]   Report called to Laurie UREÑA. NPO until 1000. Medications reviewed, VSS Reason specified below/Fall risk

## 2025-02-03 ENCOUNTER — TELEPHONE (OUTPATIENT)
Dept: PEDIATRIC GASTROENTEROLOGY | Facility: CLINIC | Age: 20
End: 2025-02-03
Payer: COMMERCIAL

## 2025-02-03 NOTE — TELEPHONE ENCOUNTER
Attempted to contact on 2/3/2025 to discuss transition timeline with patient. Ricarda Wan. LVM to return call to [phone]    Patient returned call. Scheduled 6/4 [place]

## 2025-02-13 DIAGNOSIS — K50.00 CROHN'S DISEASE OF SMALL INTESTINE WITHOUT COMPLICATION (MULTI): ICD-10-CM

## 2025-02-13 RX ORDER — HYOSCYAMINE SULFATE 0.12 MG/1
0.12 TABLET, ORALLY DISINTEGRATING ORAL EVERY 4 HOURS PRN
Qty: 30 EACH | Refills: 3 | Status: SHIPPED | OUTPATIENT
Start: 2025-02-13

## 2025-03-25 ENCOUNTER — PATIENT MESSAGE (OUTPATIENT)
Dept: PEDIATRIC GASTROENTEROLOGY | Facility: CLINIC | Age: 20
End: 2025-03-25
Payer: COMMERCIAL

## 2025-06-04 ENCOUNTER — MEDICATION MANAGEMENT (OUTPATIENT)
Dept: PEDIATRIC GASTROENTEROLOGY | Facility: CLINIC | Age: 20
End: 2025-06-04

## 2025-06-04 ENCOUNTER — NUTRITION (OUTPATIENT)
Dept: PEDIATRIC GASTROENTEROLOGY | Facility: CLINIC | Age: 20
End: 2025-06-04

## 2025-06-04 ENCOUNTER — APPOINTMENT (OUTPATIENT)
Dept: PEDIATRIC GASTROENTEROLOGY | Facility: CLINIC | Age: 20
End: 2025-06-04
Payer: COMMERCIAL

## 2025-06-04 VITALS — HEIGHT: 72 IN | WEIGHT: 167.99 LBS | TEMPERATURE: 97.1 F | BODY MASS INDEX: 22.75 KG/M2

## 2025-06-04 DIAGNOSIS — Z78.9 TRANSITION OF CARE: ICD-10-CM

## 2025-06-04 DIAGNOSIS — K50.00 CROHN'S DISEASE OF SMALL INTESTINE WITHOUT COMPLICATION (MULTI): Primary | Chronic | ICD-10-CM

## 2025-06-04 DIAGNOSIS — Z79.620 ADALIMUMAB (HUMIRA) LONG-TERM USE: ICD-10-CM

## 2025-06-04 PROCEDURE — 1036F TOBACCO NON-USER: CPT | Performed by: NURSE PRACTITIONER

## 2025-06-04 PROCEDURE — 99215 OFFICE O/P EST HI 40 MIN: CPT | Performed by: NURSE PRACTITIONER

## 2025-06-04 PROCEDURE — 3008F BODY MASS INDEX DOCD: CPT | Performed by: NURSE PRACTITIONER

## 2025-06-04 RX ORDER — ADALIMUMAB 40MG/0.4ML
40 KIT SUBCUTANEOUS
Qty: 4 EACH | Refills: 11 | Status: SHIPPED | OUTPATIENT
Start: 2025-06-04

## 2025-06-04 RX ORDER — HYOSCYAMINE SULFATE 0.12 MG/1
0.12 TABLET, ORALLY DISINTEGRATING ORAL EVERY 4 HOURS PRN
Qty: 30 EACH | Refills: 11 | Status: SHIPPED | OUTPATIENT
Start: 2025-06-04

## 2025-06-04 ASSESSMENT — ENCOUNTER SYMPTOMS
ENDOCRINE NEGATIVE: 1
CHOKING: 0
HEADACHES: 0
EYE PAIN: 0
JOINT SWELLING: 0
SEIZURES: 0
PSYCHIATRIC NEGATIVE: 1
ACTIVITY CHANGE: 0
HEMATOLOGIC/LYMPHATIC NEGATIVE: 1
FATIGUE: 0
ROS GI COMMENTS: AS NOTED IN HPI
ARTHRALGIAS: 0
EYE REDNESS: 0
COUGH: 0
APPETITE CHANGE: 0
SORE THROAT: 0
CARDIOVASCULAR NEGATIVE: 1
TROUBLE SWALLOWING: 0

## 2025-06-04 NOTE — PROGRESS NOTES
"    Pediatric Gastroenterology IBD Transition Visit      History of Present Illness:   Neo Chavez \"Angel" is a 20 y.o. male who presents to GI clinic for IBD Transition Visit.     Diagnosis: Crohn's disease of the ileum with perianal fistulas     Date of initial diagnosis: 8/17/2020    Current medications:     Humira 40mg weekly    Medical course:     Ab initially presented to pediatric GI on 8/14/2020 with symptoms of abdominal pain, hematochezia, weight loss and diarrhea. Was initially seen by pediatric surgery in June for complications of hidradenitis suppurativa of the groin (on Clincamycin and Rifampin); also seeing dermatology at the time for psoriasis, considering Humira for treatment. He was admitted to [facility] for workup, scope on 8/17/2020 significant for perianal fistula, terminal ileum ulcerations, and gastritis. He was also found to have 2 sessile polyps in his transverse colon. One was biopsied and clipped, one was resected and clipped. Biopsies resulted with acute on chronic inflammation in the TI and inflammatory polyps from the colon. MRE preformed and showed 8cm TI narrowing and thickening of bowel wall as well as perianal fistula. All findings consistent with Crohn's disease. He was started on IV steroids of which he received 4 days. Iron studies performed showing KELLY and he was given 2 doses of Venofer during admission. Was started on [id number] for induction. He was also given PPI for prophylaxis while on steroids. Nutrition consulted and recommended Boost for enteral nutrition therapy. Symptoms began to improve, steroids were weaned, taking Humira weekly. He had repeat scope on 5/25/21 that showed skin tags, polyp in the hepatic flexure, ulcers in the duodenal bulb; bx with no active disease.     He did stop his clindamycin and Rifampin in the summer of 2023, no additional complications of his HS. In October 20204, began having diarrhea, was found to have C.diff and was treated with " antibiotics. Ab's last visit with pediatric GI was on 12/11/24 and he was doing well, asymptomatic.    Notable labs/imaging:   Initial EGD/colonoscopy  8/17/2020- perianal fistula, terminal ileum ulcerations, and gastritis. He was also found to have 2 sessile polyps in his transverse colon  Most recent EGD and colonoscopy  5/25/21- polyp in the hepatic flexure, ulcers in the duodenal bulb  Initial MR Enterography & MRI Pelvis  8/18/2020- 8cm TI narrowing and thickening of bowel wall as well as perianal fistula  Most recent MR Enterography & MRI Pelvis  9/1/21-  9 mm segment of distal/terminal ileum demonstrates chronic inflammatory change with modest superimposed active inflammation- essentially stable  Anser ADA  11/22/23- 20.5, no antibodies  Infection history:   10/21/24- C.diff infection  Most recent Fecal Calprotectin  10/19/24- 52    Viral serologies:  Hepatitis A   unchecked  Hepatitis B   unchecked   Varicella   8/18/2020- no immunity       TRAQ- 100/100      Medical History  Medical History[1]    Surgical History[2]    Review of Systems  Review of Systems   Constitutional:  Negative for activity change, appetite change and fatigue.   HENT:  Negative for mouth sores, sore throat and trouble swallowing.    Eyes:  Negative for pain and redness.   Respiratory:  Negative for cough and choking.    Cardiovascular: Negative.    Gastrointestinal:         As noted in HPI   Endocrine: Negative.    Genitourinary: Negative.    Musculoskeletal:  Negative for arthralgias and joint swelling.   Skin: Negative.    Neurological:  Negative for seizures and headaches.   Hematological: Negative.    Psychiatric/Behavioral: Negative.           Allergies  RX Allergies[3]    Medications  Current Outpatient Medications   Medication Instructions   • Humira(CF) Pen 40 mg, subcutaneous, Every 7 days   • hyoscyamine (ANASPAZ) 0.125 mg, oral, Every 4 hours PRN        Objective   Wt Readings from Last 4 Encounters:   06/04/25 76.2 kg  "(167 lb 15.9 oz)   12/11/24 78.4 kg (172 lb 13.5 oz)   05/15/24 86.6 kg (190 lb 14.7 oz)   11/22/23 91.2 kg (201 lb 1 oz) (92%, Z= 1.43)*     * Growth percentiles are based on [facility] (Boys, 2-20 Years) data.     Weight percentile: Facility age limit for growth %kathe is 20 years.  Height percentile: Facility age limit for growth %kathe is 20 years.  BMI percentile: Facility age limit for growth %kathe is 20 years.    Physical Exam  DEFERRED      IMPRESSION & RECOMMENDATIONS/PLAN: Neo Chavez is a 20 y.o. old who presents for to the Pediatric Gastroenterology clinic today for IBD Transition Clinic.    Neo Chavez \"Ab\" has Crohn's disease and is on Humira for treatment. No changes to medical management; will plan to transition to adult GI. Medical summary reviewed with Ab. Recommendations discussed with multidisciplinary team - pharmacist, dietitian, .      BRIAN Soriano-CNP  Division of Pediatric Gastroenterology, Hepatology and Nutrition         [1]  Past Medical History:  Diagnosis Date   • Abdominal pain 06/15/2023   • Encopresis 06/15/2023   • Excoriated rash 06/15/2023   • Gastritis 06/15/2023   • Hematochezia 06/15/2023   • Hidradenitis suppurativa 06/15/2023   • 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 06/15/2023   • Personal history of other specified conditions 10/28/2020    History of abdominal pain   • Unspecified injury of unspecified ankle, initial encounter 09/09/2015    Injury, ankle   • Unspecified open wound of abdominal wall, unspecified quadrant without penetration into peritoneal cavity, initial encounter 08/10/2020    Nonhealing open wound of groin   • Unspecified otitis externa, unspecified ear 07/16/2016    Otitis externa   • Weight loss, abnormal 06/15/2023   [2]  Past Surgical History:  Procedure Laterality Date   • OTHER SURGICAL HISTORY  08/14/2020    No history of surgery   [3]  No Known Allergies  "

## 2025-06-04 NOTE — LETTER
"June 4, 2025     Jabier Harrison MD  [address]  [address]  [facility] [address]    Patient: Ab Chavez   YOB: 2005   Date of Visit: 6/4/2025       Dear Dr. Jabier Harrison MD:    Thank you for referring Ab Chavez to me for evaluation. Below are my notes for this consultation.  If you have questions, please do not hesitate to call me. I look forward to following your patient along with you.       Sincerely,     Antonietta Wan, APRN-CNP      CC: No Recipients  ______________________________________________________________________________________        Pediatric Gastroenterology IBD Transition Visit      History of Present Illness:   Neo Chavez \"Angel" is a 20 y.o. male who presents to GI clinic for IBD Transition Visit.     Diagnosis: Crohn's disease of the ileum with perianal fistulas     Date of initial diagnosis: 8/17/2020    Current medications:     Humira 40mg weekly    Medical course:     Ab initially presented to pediatric GI on 8/14/2020 with symptoms of abdominal pain, hematochezia, weight loss and diarrhea. Was initially seen by pediatric surgery in June for complications of hidradenitis suppurativa of the groin (on Clincamycin and Rifampin); also seeing dermatology at the time for psoriasis, considering Humira for treatment. He was admitted to [facility] for workup, scope on 8/17/2020 significant for perianal fistula, terminal ileum ulcerations, and gastritis. He was also found to have 2 sessile polyps in his transverse colon. One was biopsied and clipped, one was resected and clipped. Biopsies resulted with acute on chronic inflammation in the TI and inflammatory polyps from the colon. MRE preformed and showed 8cm TI narrowing and thickening of bowel wall as well as perianal fistula. All findings consistent with Crohn's disease. He was started on IV steroids of which he received 4 days. Iron studies performed showing KELLY and he was given 2 doses of Venofer during admission. Was " started on [id number] for induction. He was also given PPI for prophylaxis while on steroids. Nutrition consulted and recommended Boost for enteral nutrition therapy. Symptoms began to improve, steroids were weaned, taking Humira weekly. He had repeat scope on 5/25/21 that showed skin tags, polyp in the hepatic flexure, ulcers in the duodenal bulb; bx with no active disease.     He did stop his clindamycin and Rifampin in the summer of 2023, no additional complications of his HS. In October 20204, began having diarrhea, was found to have C.diff and was treated with antibiotics. Ab's last visit with pediatric GI was on 12/11/24 and he was doing well, asymptomatic.    Notable labs/imaging:   Initial EGD/colonoscopy  8/17/2020- perianal fistula, terminal ileum ulcerations, and gastritis. He was also found to have 2 sessile polyps in his transverse colon  Most recent EGD and colonoscopy  5/25/21- polyp in the hepatic flexure, ulcers in the duodenal bulb  Initial MR Enterography & MRI Pelvis  8/18/2020- 8cm TI narrowing and thickening of bowel wall as well as perianal fistula  Most recent MR Enterography & MRI Pelvis  9/1/21-  9 mm segment of distal/terminal ileum demonstrates chronic inflammatory change with modest superimposed active inflammation- essentially stable  Anser ADA  11/22/23- 20.5, no antibodies  Infection history:   10/21/24- C.diff infection  Most recent Fecal Calprotectin  10/19/24- 52    Viral serologies:  Hepatitis A   unchecked  Hepatitis B   unchecked   Varicella   8/18/2020- no immunity       TRAQ- 100/100      Medical History  Medical History[1]    Surgical History[2]    Review of Systems  Review of Systems   Constitutional:  Negative for activity change, appetite change and fatigue.   HENT:  Negative for mouth sores, sore throat and trouble swallowing.    Eyes:  Negative for pain and redness.   Respiratory:  Negative for cough and choking.    Cardiovascular: Negative.    Gastrointestinal:        "  As noted in HPI   Endocrine: Negative.    Genitourinary: Negative.    Musculoskeletal:  Negative for arthralgias and joint swelling.   Skin: Negative.    Neurological:  Negative for seizures and headaches.   Hematological: Negative.    Psychiatric/Behavioral: Negative.           Allergies  RX Allergies[3]    Medications  Current Outpatient Medications   Medication Instructions   • Humira(CF) Pen 40 mg, subcutaneous, Every 7 days   • hyoscyamine (ANASPAZ) 0.125 mg, oral, Every 4 hours PRN        Objective   Wt Readings from Last 4 Encounters:   06/04/25 76.2 kg (167 lb 15.9 oz)   12/11/24 78.4 kg (172 lb 13.5 oz)   05/15/24 86.6 kg (190 lb 14.7 oz)   11/22/23 91.2 kg (201 lb 1 oz) (92%, Z= 1.43)*     * Growth percentiles are based on [facility] (Boys, 2-20 Years) data.     Weight percentile: Facility age limit for growth %kathe is 20 years.  Height percentile: Facility age limit for growth %kathe is 20 years.  BMI percentile: Facility age limit for growth %kathe is 20 years.    Physical Exam  DEFERRED      IMPRESSION & RECOMMENDATIONS/PLAN: Neo Chavez is a 20 y.o. old who presents for to the Pediatric Gastroenterology clinic today for IBD Transition Clinic.    Neo Chavez \"Ab\" has Crohn's disease and is on Humira for treatment. No changes to medical management; will plan to transition to adult GI. Medical summary reviewed with Ab. Recommendations discussed with multidisciplinary team - pharmacist, dietitian, .      BRIAN Soriano-CNP  Division of Pediatric Gastroenterology, Hepatology and Nutrition         [1]  Past Medical History:  Diagnosis Date   • Abdominal pain 06/15/2023   • Encopresis 06/15/2023   • Excoriated rash 06/15/2023   • Gastritis 06/15/2023   • Hematochezia 06/15/2023   • Hidradenitis suppurativa 06/15/2023   • 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 06/15/2023   • Personal history of other specified conditions 10/28/2020    History of abdominal pain   • Unspecified " injury of unspecified ankle, initial encounter 09/09/2015    Injury, ankle   • Unspecified open wound of abdominal wall, unspecified quadrant without penetration into peritoneal cavity, initial encounter 08/10/2020    Nonhealing open wound of groin   • Unspecified otitis externa, unspecified ear 07/16/2016    Otitis externa   • Weight loss, abnormal 06/15/2023   [2]  Past Surgical History:  Procedure Laterality Date   • OTHER SURGICAL HISTORY  08/14/2020    No history of surgery   [3]  No Known Allergies       [1]  Past Medical History:  Diagnosis Date   • Abdominal pain 06/15/2023   • Encopresis 06/15/2023   • Excoriated rash 06/15/2023   • Gastritis 06/15/2023   • Hematochezia 06/15/2023   • Hidradenitis suppurativa 06/15/2023   • 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 06/15/2023   • Personal history of other specified conditions 10/28/2020    History of abdominal pain   • Unspecified injury of unspecified ankle, initial encounter 09/09/2015    Injury, ankle   • Unspecified open wound of abdominal wall, unspecified quadrant without penetration into peritoneal cavity, initial encounter 08/10/2020    Nonhealing open wound of groin   • Unspecified otitis externa, unspecified ear 07/16/2016    Otitis externa   • Weight loss, abnormal 06/15/2023   [2]  Past Surgical History:  Procedure Laterality Date   • OTHER SURGICAL HISTORY  08/14/2020    No history of surgery   [3]  No Known Allergies  Calm

## 2025-06-04 NOTE — PATIENT INSTRUCTIONS
1. Continue Humira  2. Hyoscyamine as needed for abdominal pain  3. Will transition to adult GI provider once you finish school- please let me know where you will be so I can help you find a provider in that area ([place] vs out of state)

## 2025-06-04 NOTE — PROGRESS NOTES
DISEASE MAINTENANCE MEDICATION:    TOLERANCE:  Have you experienced any side effects from this medication? No side effects    Are there any changes to current therapy regimen? No changes      EFFICACY:  Have you developed any new symptoms of your condition? Doing well on therapy    How do you feel your medication is affecting your disease state? Patient doing well      COMPLIANCE / ADHERENCE:  Have you had any unplanned missed doses? No missed doses    What barriers to adherence does the patient have? None    Does the patient have any barriers to self-administration (including physical and mental)? No    GENERAL ASSESSMENT:    Are there any changes to your home medications, OTCs or supplements? No changes    Do you have any new allergies? None    Have you been diagnosed with any new medical conditions? None      PATIENT MANAGEMENT:    Do you have any questions regarding your medications, or care? None at this time    Do you have any concerns with access to your medications? No issues accessing medications with [facility]    Blayne Nielsen PharmD  Specialty Pharmacy Resident  [facility]   Fax: [phone]  Phone: [phone]

## 2025-06-04 NOTE — PROGRESS NOTES
"    Pediatric Gastroenterology, Hepatology & Nutrition     Nutrition Therapy Education Session -IBD Multidisciplinary Clinic    Review of Nutrition, GI concerns and Elimination per Caregiver(s):  Current diet:  Regular   Difficulties with meals? No. Diverse   Current stooling frequency/concerns? No concerns   Other GI complaints? No concerns     Additional Information Discussed:  Will be a 3th year.  Prepares meals.    Growth:  Wt Readings from Last 6 Encounters:   06/04/25 76.2 kg (167 lb 15.9 oz)   12/11/24 78.4 kg (172 lb 13.5 oz)   05/15/24 86.6 kg (190 lb 14.7 oz)   11/22/23 91.2 kg (201 lb 1 oz) (92%, Z= 1.43)*   06/15/23 95.8 kg (211 lb 3.2 oz) (96%, Z= 1.70)*   06/07/23 96.6 kg (212 lb 15.4 oz) (96%, Z= 1.74)*     * Growth percentiles are based on CDC (Boys, 2-20 Years) data.      Ht Readings from Last 6 Encounters:   06/04/25 1.829 m (6' 0.01\")   12/11/24 1.815 m (5' 11.46\")   05/15/24 1.822 m (5' 11.73\")   11/22/23 1.829 m (6' 0.01\") (80%, Z= 0.85)*   06/07/23 1.83 m (6' 0.05\") (81%, Z= 0.88)*   06/01/22 1.833 m (6' 0.17\") (84%, Z= 0.98)*     * Growth percentiles are based on CDC (Boys, 2-20 Years) data.     BMI Readings from Last 6 Encounters:   06/04/25 22.78 kg/m²   12/11/24 23.80 kg/m²   05/15/24 26.09 kg/m²   11/22/23 27.26 kg/m² (86%, Z= 1.10)*   06/15/23 28.61 kg/m² (92%, Z= 1.41)*   06/07/23 28.85 kg/m² (93%, Z= 1.46)*     * Growth percentiles are based on CDC (Boys, 2-20 Years) data.     Nutrition Diagnosis: Nutrition related knowledge deficit regarding general healthy eating with IBD.    Nutrition Intervention/Plan:  Nutrition Instruction Provided & Material/Literature provided: Today provided general healthy eating guidelines for IBD and micronutrient intake guidelines with IBD.  Also today provided Mediterranean Nutrition information - recipes and websites for additional food and recipe ideas.  Evaluation of Parent/Caregiver/Patient: Verbalizes understanding. Family was receptive.  Frequency of " Care: Follow up not needed at this time. Please call the office with nutrition questions or concerns.

## 2025-06-11 ENCOUNTER — APPOINTMENT (OUTPATIENT)
Dept: PEDIATRIC GASTROENTEROLOGY | Facility: CLINIC | Age: 20
End: 2025-06-11
Payer: COMMERCIAL

## 2025-09-02 ENCOUNTER — TELEPHONE (OUTPATIENT)
Dept: PEDIATRIC GASTROENTEROLOGY | Facility: CLINIC | Age: 20
End: 2025-09-02
Payer: COMMERCIAL

## 2025-12-03 ENCOUNTER — APPOINTMENT (OUTPATIENT)
Dept: PEDIATRIC GASTROENTEROLOGY | Facility: CLINIC | Age: 20
End: 2025-12-03
Payer: COMMERCIAL